# Patient Record
Sex: MALE | Race: WHITE
[De-identification: names, ages, dates, MRNs, and addresses within clinical notes are randomized per-mention and may not be internally consistent; named-entity substitution may affect disease eponyms.]

---

## 2019-08-05 ENCOUNTER — HOSPITAL ENCOUNTER (OUTPATIENT)
Dept: HOSPITAL 61 - RAD | Age: 54
Discharge: HOME | End: 2019-08-05
Attending: SPECIALIST
Payer: COMMERCIAL

## 2019-08-05 DIAGNOSIS — Z01.818: Primary | ICD-10-CM
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
## 2019-08-05 DIAGNOSIS — S46.012A: ICD-10-CM
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
## 2019-08-05 DIAGNOSIS — R94.31: ICD-10-CM
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
## 2019-08-05 LAB
ALBUMIN SERPL-MCNC: 4.1 G/DL (ref 3.4–5)
ALBUMIN/GLOB SERPL: 1.2 {RATIO} (ref 1–1.7)
ALP SERPL-CCNC: 67 U/L (ref 46–116)
ALT SERPL-CCNC: 29 U/L (ref 16–63)
ANION GAP SERPL CALC-SCNC: 13 MMOL/L (ref 6–14)
APTT BLD: 27 SEC (ref 24–38)
AST SERPL-CCNC: 23 U/L (ref 15–37)
BASOPHILS # BLD AUTO: 0 X10^3/UL (ref 0–0.2)
BASOPHILS NFR BLD: 0 % (ref 0–3)
BILIRUB SERPL-MCNC: 0.4 MG/DL (ref 0.2–1)
BUN SERPL-MCNC: 22 MG/DL (ref 8–26)
BUN/CREAT SERPL: 17 (ref 6–20)
CALCIUM SERPL-MCNC: 8.9 MG/DL (ref 8.5–10.1)
CHLORIDE SERPL-SCNC: 104 MMOL/L (ref 98–107)
CO2 SERPL-SCNC: 24 MMOL/L (ref 21–32)
CREAT SERPL-MCNC: 1.3 MG/DL (ref 0.7–1.3)
EOSINOPHIL NFR BLD: 0.1 X10^3/UL (ref 0–0.7)
EOSINOPHIL NFR BLD: 1 % (ref 0–3)
ERYTHROCYTE [DISTWIDTH] IN BLOOD BY AUTOMATED COUNT: 14.7 % (ref 11.5–14.5)
GFR SERPLBLD BASED ON 1.73 SQ M-ARVRAT: 57.3 ML/MIN
GLOBULIN SER-MCNC: 3.3 G/DL (ref 2.2–3.8)
GLUCOSE SERPL-MCNC: 132 MG/DL (ref 70–99)
HCT VFR BLD CALC: 42.4 % (ref 39–53)
HGB BLD-MCNC: 14.6 G/DL (ref 13–17.5)
LYMPHOCYTES # BLD: 1.7 X10^3/UL (ref 1–4.8)
LYMPHOCYTES NFR BLD AUTO: 26 % (ref 24–48)
MCH RBC QN AUTO: 31 PG (ref 25–35)
MCHC RBC AUTO-ENTMCNC: 34 G/DL (ref 31–37)
MCV RBC AUTO: 89 FL (ref 79–100)
MONO #: 0.4 X10^3/UL (ref 0–1.1)
MONOCYTES NFR BLD: 6 % (ref 0–9)
NEUT #: 4.4 X10^3/UL (ref 1.8–7.7)
NEUTROPHILS NFR BLD AUTO: 66 % (ref 31–73)
PLATELET # BLD AUTO: 190 X10^3/UL (ref 140–400)
POTASSIUM SERPL-SCNC: 3.9 MMOL/L (ref 3.5–5.1)
PROT SERPL-MCNC: 7.4 G/DL (ref 6.4–8.2)
PROTHROMBIN TIME: 11.8 SEC (ref 11.7–14)
RBC # BLD AUTO: 4.75 X10^6/UL (ref 4.3–5.7)
SODIUM SERPL-SCNC: 141 MMOL/L (ref 136–145)
WBC # BLD AUTO: 6.7 X10^3/UL (ref 4–11)

## 2019-08-05 PROCEDURE — 85730 THROMBOPLASTIN TIME PARTIAL: CPT

## 2019-08-05 PROCEDURE — 85651 RBC SED RATE NONAUTOMATED: CPT

## 2019-08-05 PROCEDURE — 36415 COLL VENOUS BLD VENIPUNCTURE: CPT

## 2019-08-05 PROCEDURE — 85025 COMPLETE CBC W/AUTO DIFF WBC: CPT

## 2019-08-05 PROCEDURE — 71046 X-RAY EXAM CHEST 2 VIEWS: CPT

## 2019-08-05 PROCEDURE — 93005 ELECTROCARDIOGRAM TRACING: CPT

## 2019-08-05 PROCEDURE — 85610 PROTHROMBIN TIME: CPT

## 2019-08-05 PROCEDURE — 80053 COMPREHEN METABOLIC PANEL: CPT

## 2019-08-05 NOTE — RAD
EXAM: Chest, 2 views.

 

HISTORY: Preoperative evaluation. Pain.

 

COMPARISON: None.

 

FINDINGS: A frontal view of the chest is obtained. There is no infiltrate,

pleural effusion or pneumothorax. The heart is normal in size.

 

IMPRESSION: No acute pulmonary finding.

 

Electronically signed by: Courtney Caruso MD (8/5/2019 12:35 PM) [facility]

## 2019-08-05 NOTE — EKG
General acute hospital

               [address] [place] [address]

Test Date:    2019               Test Time:    12:41:50

Pat Name:     ABDIRAHMAN SOLOMON        Department:   

Patient ID:   [id number]           Room:          

Gender:       M                        Technician:   

:          1965               Requested By: DAI SANCHEZ

Order Number: [id number]           Reading MD:     

                                 Measurements

Intervals                              Axis          

Rate:         95                       P:            34

WV:           160                      QRS:          27

QRSD:         80                       T:            6

QT:           344                                    

QTc:          435                                    

                           Interpretive Statements

SINUS RHYTHM

LEFT ATRIAL ABNORMALITY

ABNORMAL ECG

RI6.01          Unconfirmed report

No previous ECG available for comparison

## 2021-03-19 VITALS
SYSTOLIC BLOOD PRESSURE: 183 MMHG | DIASTOLIC BLOOD PRESSURE: 111 MMHG | SYSTOLIC BLOOD PRESSURE: 183 MMHG | DIASTOLIC BLOOD PRESSURE: 111 MMHG | DIASTOLIC BLOOD PRESSURE: 111 MMHG | SYSTOLIC BLOOD PRESSURE: 183 MMHG

## 2021-09-15 ENCOUNTER — HOSPITAL ENCOUNTER (OUTPATIENT)
Dept: HOSPITAL 61 - KCIC MRI | Age: 56
End: 2021-09-15
Attending: FAMILY MEDICINE
Payer: COMMERCIAL

## 2021-09-15 DIAGNOSIS — M51.25: Primary | ICD-10-CM

## 2021-09-15 DIAGNOSIS — M48.05: ICD-10-CM

## 2021-09-15 DIAGNOSIS — M25.78: ICD-10-CM

## 2021-09-15 DIAGNOSIS — M51.46: ICD-10-CM

## 2021-09-15 DIAGNOSIS — M41.86: ICD-10-CM

## 2021-09-15 PROCEDURE — 72148 MRI LUMBAR SPINE W/O DYE: CPT

## 2021-09-15 NOTE — [facility]
EXAM: Lumbar spine MRI without contrast.



HISTORY: Lower back pain. Sciatica.



TECHNIQUE: Multiplanar, multisequence magnetic resonance imaging of the lumbar spine was performed wi
thout contrast.



COMPARISON: None.



FINDINGS: There is a transitional lumbosacral segment. This is considered a partially sacralized L6 s
egment with rudimentary L6-S1 disc based on the number of nonrib-bearing lumbar segments. There is mi
ld lumbar scoliosis. There is no significant listhesis. There is degenerative endplate remodeling and
 disc space narrowing at multiple levels, predominantly at L5 to L6. There are few small endplate Yessica
morl's nodes. There are few osseous hemangiomas. The conus terminates at L1-L2.



At T12-L1, there is a shallow right paracentral to lateral recess disc protrusion and tiny left parac
entral disc protrusion superimposed on endplate remodeling. There is mild bilateral facet arthropathy
. There is mild right foraminal stenosis.



At L1-L2, there is no stenosis.



At L2-L3, there is left lateral predominant endplate remodeling. There is mild left greater than righ
t facet arthropathy. There is prominent dorsal epidural fat. There is no stenosis.



At L3-L4, there is a left extra foraminal to lateral disc protrusion superimposed on a diffuse disc b
ulge and endplate osteophytosis. There is moderate bilateral facet arthropathy. There is prominent do
rsal epidural fat. There is mild left greater than right foraminal stenosis with abutment the exiting
 left L3 nerve root. There is mild central canal stenosis.



At L4-L5, there is a shallow broad-based right paracentral disc protrusion superimposed on a disc bul
ge and endplate osteophytosis. There is mild pleural facet arthropathy. There is moderate right and m
ild left foraminal stenosis with abutment the exiting L4 nerve roots. There is mild central canal michelle
nosis.



At L5 to L6, there is a right lateral predominant disc bulge and endplate osteophytosis. There is a s
hallow broad-based posterior central disc protrusion and annular tear. There is mild right foraminal 
stenosis. The exiting left L5 nerve root is not well seen likely due to slice positioning.



At L6-S1, there is a rudimentary disc. There is no stenosis.



IMPRESSION: 

1. Transitional lumbosacral segment, considered L6 for this dictation.

2. Multilevel degenerative change involving the lumbar spine and lower thoracic spine, described in d
etail above. This is associated with mild right foraminal stenosis at T12-L1, mild left greater than 
right foraminal and central canal stenosis at L3-L4, moderate right and mild left foraminal and mild 
central canal stenosis at L4-L5, and mild right foraminal stenosis at L5-L6.



Electronically signed by: Courtney Caruso MD (9/15/2021 3:41 PM) [id number]

## 2021-09-30 ENCOUNTER — HOSPITAL ENCOUNTER (OUTPATIENT)
Dept: HOSPITAL 61 - PNCL | Age: 56
End: 2021-09-30
Attending: ANESTHESIOLOGY
Payer: COMMERCIAL

## 2021-09-30 DIAGNOSIS — Z98.890: ICD-10-CM

## 2021-09-30 DIAGNOSIS — M79.661: ICD-10-CM

## 2021-09-30 DIAGNOSIS — M54.5: Primary | ICD-10-CM

## 2021-09-30 DIAGNOSIS — M19.90: ICD-10-CM

## 2021-09-30 DIAGNOSIS — I10: ICD-10-CM

## 2021-09-30 PROCEDURE — 99214 OFFICE O/P EST MOD 30 MIN: CPT

## 2021-09-30 PROCEDURE — G0463 HOSPITAL OUTPT CLINIC VISIT: HCPCS

## 2021-09-30 NOTE — PDOC1
INITIAL PAIN CONSULT


DATE OF SERVICE:


DOS:


DATE: 9/30/21 


TIME: 16:01





CHIEF COMPLAINT:


Chief Complaint:


Low back and right lower extremity pain





HISTORY OF PRESENT ILLNESS:


56-year-old male presents history of pain low back right lower extremity since 

June 26, 2021.  Patient reports he did not have any specific injury or accident 

that he is aware of but woke up that morning had significant pain in the low sosa

k rating the right lower extremity posterior gluteus posterior lateral thigh 

lateral anterior thigh anteromedial thigh medial lower leg with cramping in the 

calf as well on the right side patient reports he is a  and had many 

small injuries over the years with strains and so forth but has never had a 

significant pain in the low back and right leg like this.  Patient reports he 

was loading a hose bed and his right foot was caught between the hose load and 

the bed and niece had a sudden jerk to the hose from his left causing an abrupt 

left twisting motion of his back and leg.  Patient reports it wakes him sleep 

about 2-3 times a night does not affect her bowel bladder control but does 

affect his go to walk he feels some fatigability in the right leg but not the 

left patient reports that sharp and stabbing shooting in the leg change during 

the day with activity worse with walking standing changing positions better with

sitting or laying down but again wakes him from sleep patient reports radiating 

the right leg and cramping in the legs well patient rates disability rating 0-10

10 being the worst is a 7 with family home was possibilities recreation as well 

sexual behavior 10 with occupational activity 6 with social activity self-care 

and life support activities specially.  Patient underwent physical therapy and 

is still doing the stretching strength exercises with this doing these daily and

reports it did help slightly but still significant pain in the low back and 

right lower extremity.  Patient is taking hydrocodone which does decrease the 

pain also Flexeril which is a mildly decreased the pain alternating Tylenol and 

ibuprofen both which do decrease it but only mildly as well.  Patient did have a

MRI scan of the lumbar spine show transitional lumbosacral segment with a 

partially sacralized L6 segment with the degenerative endplate remodeling and to

space narrowing at multiple levels predominantly L5-L6 as well as L4-5 with a 

shallow broad-based right paracentral disc protrusion superimposed on a disc bu

lge and endplate osteophytosis with moderate right and mild left foraminal 

stenosis and abutment of the exiting L4 nerve roots.





PAST MEDICAL HISTORY:


PMH:


Hypertension, diverticulosis, hearing loss, arthritis





PREVIOUS SURGERIES:


Past Surgical Hx:


Tonsillectomy, appendectomy, left rotator cuff repair, left hip surgery 2008, 

right hip surgery 2010, vocal hernia, left knee surgery with tendon repair 2015.





CURRENT MEDICATIONS:


Current Meds:





Active Scripts








 Medications  Dose


 Route/Sig


 Max Daily Dose Days Date Category


 


 Multi Vitamin


 Daily


  (Multivitamin) 1


 Each Tablet  1 Each


 PO DAILY


   3/16/21 Reported


 


 Aspirin 81 Mg


 Tab.chew  81 Mg


 PO DAILY


   3/16/21 Reported


 


 Fish Oil 1,200 mg


 Softgel


  (Omega-3/Dha/Epa/Fish


 Oil) 1 Each


 Capsule.dr  1 Each


 PO DAILY


   3/16/21 Reported


 


 Msm-Glucosamine


 250-250 Mg Cap


  (Glucosamine


 Sulfate/Msm) 1


 Each Capsule  1 Each


 PO DAILY


   3/16/21 Reported


 


 Losartan


 Potassium 100 Mg


 Tablet  100 Mg


 PO DAILY


   3/16/21 Reported











ALLERGIES;


Allergies:  


Coded Allergies:  


     No Known Drug Allergies (Unverified , 3/16/21)





FAMILY HISTORY:


Family Hx:


No major medical problems or conditions that he is aware of





SOCIAL HISTORY:


Social Hx:


Patient drinks alcohol only very rarely smokes less than a pack of cigarettes a 

week and has for about 5 years does not use any illegal illicit recreational 

drugs is  with spouse has 1 child at home lives locally in [place] 

[place] and is a , patient currently off work secondary to current 

pain situation.





REVIEW OF SYSTEMS:


ROS:


Positive for those items mentioned in history of present illness, all systems 

are reviewed, otherwise negative ,and are complete full and well-documented on 

patient's chart.





PHYSICAL EXAM:


VS:


Blood pressure is 131/87 pulse 99 respirations 18 temperature 90.5 F height is 

5 foot 11 inches weight is 226 pounds


PE:


PHYSICAL EXAMINATION:





GENERAL: The patient is awake, alert, oriented, appropriate, very pleasant 

demeanor


HEENT: Shows normocephalic, atraumatic.  Extraocular movements are intact and 

symmetrical.  Oral cavity: Mucous membranes moist and pink.


NECK: Shows anterior throat supple without palpable lymphadenopathy noted.  

Swallow reflex symmetrical.


CHEST: Shows normal on inspection.  Breath sounds are clear bilaterally, no 

rales rhonchi wheezes auscultated.


HEART: Shows S1, S2 clear.  No murmurs auscultated.


ABDOMEN: Soft, nontender, nondistended.  No palpable organomegaly is noted.  No 

rebound or guarding demonstrated.


BACK: Shows spine grossly in the midline.  Normal-appearing cervical lordotic 

curvature.  There is slightly increased thoracic kyphosis, some flattening of 

the lumbar lordotic curvature.  Lumbar paraspinous muscles show symmetrical on 

inspection, on palpation shows some moderate tenderness diffusely throughout the

upper, middle and lower distribution of the paraspinous muscles bilaterally and 

also into the lower thoracic paraspinous musculature, firm and tender, but 

without specific trigger points, without radiation of pain.  The patient has 

good rotational motion of the lumbar spine, both laterally as well as extension 

and flexion without significant difficulty.  No tenderness over the spinous 

processes, sacrum or sacroiliac regions.


EXTREMITIES: Lower extremities show deep tendon reflexes 2+ in the patellar and 

tendo calcaneus tendons.  Motor exam is 4 on a scale of 5 with right 

dorsiflexion, extension, quadriceps and hamstring flexion and 5/5 on the left.  

Peripheral pulses are 1 posterior tibial.  No peripheral edema is noted 

bilaterally.  Lower extremities are warm and dry to touch, equal in color and 

appearance.  Straight leg raise noted to be positive on the right about 45 

degrees, left side is neck.  Gaenslen's and Casimiro's maneuvers are negative 

bilaterally.  The patient is able to stand, stand on his toes that significant 

difficulty loss of balance, walks with a normal-appearing gait but does appear 

to favor the right lower extremity mildly.  Patient not use any assistive device

such as canes or walker to ambulate.


SKIN: Shows warm and dry, good turgor.  No edema.  No sores, rashes or bruising 

throughout.





IMPRESSION:


Impression:


56-year-old male with proximate 3-1/2-month history pain low back right lower 

extremity radicular fashion following L4-5 dermatomal distribution.


MRI scan lumbar spine as noted


Hypertension


Arthritis


Diverticulosis


Plan: Options were discussed with the patient including serve medical management

physical therapy interventional techniques.  As patient has done physical 

therapy and still doing stretching strength exercises at home as well as taking 

oral analgesics without significant improvement patient elects interventional 

techniques.  We discussed a lumbar epidural steroid injections description as 

well as anatomical models described procedure.  Patient will wait for 

preauthorization with insurance provider, once this is obtained return for a 

translaminar approach L4-5 level lumbar epidural steroid injection at that time 

with fluoroscopic guidance.  In the meantime, patient will continue with 

stretching strength exercises daily exercising as tolerated and oral analgesics 

as currently.











EZ MACHUCA MD               Sep 30, 2021 16:08

## 2021-10-20 ENCOUNTER — HOSPITAL ENCOUNTER (OUTPATIENT)
Dept: HOSPITAL 61 - PNCL | Age: 56
Discharge: HOME | End: 2021-10-20
Attending: ANESTHESIOLOGY
Payer: COMMERCIAL

## 2021-10-20 DIAGNOSIS — Z79.82: ICD-10-CM

## 2021-10-20 DIAGNOSIS — Z72.89: ICD-10-CM

## 2021-10-20 DIAGNOSIS — Z79.899: ICD-10-CM

## 2021-10-20 DIAGNOSIS — Z98.890: ICD-10-CM

## 2021-10-20 DIAGNOSIS — M48.061: ICD-10-CM

## 2021-10-20 DIAGNOSIS — M51.16: Primary | ICD-10-CM

## 2021-10-20 DIAGNOSIS — F17.210: ICD-10-CM

## 2021-10-20 DIAGNOSIS — I10: ICD-10-CM

## 2021-10-20 PROCEDURE — 62323 NJX INTERLAMINAR LMBR/SAC: CPT

## 2021-10-20 NOTE — PDOC4
Procedure Note:


ICD 10 Code:


ICD 10 Code:


M54.16


M51.36


M4 8.06





Procedure Note:


Patient was consented for lumbar epidural steroid injection with fluoroscopic 

guidance.  Risks were discussed including but not limited to: Bleeding, 

infection, possibility of epidural hematoma and subsequent neurological 

compromise, dural puncture, headaches, spinal cord and/or nerve damage, side 

effects of steroid medication, and poor results regarding pain control.  Patient

understands and wished to proceed.


Procedure is lumbar epidural steroid injection under local anesthetic using michelle

rile prep and drape at the L4-5 level using C-arm fluoroscopic guidance in both 

AP and lateral views medications injected is 120 mg Depo-Medrol +10mL 

preservative-free normal saline and 2 mL contrast- condition at discharge is 

stable patient tolerated procedure well had no complications.











EZ MACHUCA MD               Oct 20, 2021 14:45

## 2021-10-20 NOTE — PDOC
Progress Note - Pain Clinic


Date of Service:


DOS:


DATE: 10/20/21 


TIME: 14:42





Diagnosis:


Dx:


Lumbar radiculopathy with lumbar degenerative disease and lumbar spinal stenosis





History or Present Illness:


HPI:


56-year-old male returns complaining of pain low back and right lower extremity 

posterior gluteus posterior lateral thigh lateral anterior thigh anteromedial 

thigh worse with walking standing changing positions better with laying down but

is awakened from sleep still about every 6 hours patient reports her pain a 7 on

scale 10 is worse over the past week 7 on average 6 its least is a 7 today 

patient ports shooting aching sharp in the back radiating and stabbing in the 

right leg can be unbearable with extended standing and walking.  Patient reports

is better with sitting again but still waking her from sleep at night however 

patient reports no bowel or bladder incontinence no loss of motor function but 

significant fatigability the right leg and has stumbled twice since his last 

visit which is new as his leg does feel more and more weak progressively with 

time and with activity.  Patient reports no loss of bowel or bladder continence 

however.





Physical Exam:


VS:


Blood pressure is 146/89 pulse 71 respirations are 18 temperature is 98.6 F 

height is 5 foot 11 inches weight is 233 pounds


PE:


PHYSICAL EXAMINATION:





GENERAL: The patient is awake, alert, oriented, appropriate, very pleasant in 

demeanor


HEENT: Shows normocephalic, atraumatic.  Extraocular movements are intact and 

symmetrical.  Oral cavity: Mucous membranes moist and pink.  Dentition is 

intact.


NECK: Shows anterior throat supple without palpable lymphadenopathy noted.  

Swallow reflex symmetrical.


CHEST: Shows normal on inspection.  Breath sounds are clear bilaterally, no 

rales or rhonchi.


HEART: Shows S1, S2 clear.  No murmurs auscultated.


ABDOMEN: Soft, nontender, nondistended, obese.  No palpable organomegaly is 

noted. 


BACK: Shows spine grossly in the midline.  Normal-appearing cervical lordotic 

curvature.  There is slightly increased thoracic kyphosis, some minor flattening

of the lumbar lordotic curvature.  Lumbar paraspinous muscles show symmetrical 

on inspection, on palpation shows some moderate tenderness diffusely throughout 

the upper, middle and lower distribution of the paraspinous muscles without 

specific trigger points, without radiation of pain.  The patient has good 

rotational motion of the lumbar spine, both laterally as well as extension and 

flexion without significant difficulty.  


EXTREMITIES: Lower extremities show deep tendon reflexes 2+ in the patellar and 

tendo calcaneus tendons.  Motor exam is 5 on a scale of 5 with right 

dorsiflexion, extension, quadriceps and hamstring flexion and 5/5 on the left.  

Peripheral pulses are 1 posterior tibial.  No peripheral edema is noted 

bilaterally.  Lower extremities are warm and dry to touch, equal in color and 

appearance. 


SKIN: Shows warm and dry, good turgor.  No edema.  No sores, rashes or bruising 

throughout.





Procedure:


Procedure:


Options were discussed with patient.  Patient chart reviews his current 

medication regimen updated current review of systems updated today as well.  We 

will proceed with a lumbar epidural steroid injection today with fluoroscopic 

guidance risks were discussed including but not limited to: Bleeding, infection,

possibility of epidural hematoma and subsequent neurological compromise, dural 

puncture, headaches, spinal cord and/or nerve damage, side effects of steroid 

medication, and poor results regarding pain control.  Patient understands and 

wished to proceed.  Patient return to the clinic in approximate 2 weeks for 

follow-up, was counseled as return appointment, activity level, and side effect 

to be aware of.





Medication Injected:


Med Injected:


Procedure is lumbar epidural steroid injection under local anesthetic using 

sterile prep and drape at the L4-5 level using C-arm fluoroscopic guidance in 

both AP and lateral views medications injected is 120 mg Depo-Medrol +10mL 

preservative-free normal saline and 2 mL contrast- condition at discharge is 

stable patient tolerated procedure well had no complications.





Condition at Discharge:


Condition at Discharge:


Patient discharge stable, patient tolerated procedure well and had no 

complications.











EZ MACHUCA MD               Oct 20, 2021 14:45

## 2021-11-10 ENCOUNTER — HOSPITAL ENCOUNTER (EMERGENCY)
Dept: HOSPITAL 61 - ER | Age: 56
Discharge: HOME | End: 2021-11-10
Payer: COMMERCIAL

## 2021-11-10 VITALS
SYSTOLIC BLOOD PRESSURE: 157 MMHG | SYSTOLIC BLOOD PRESSURE: 157 MMHG | DIASTOLIC BLOOD PRESSURE: 97 MMHG | DIASTOLIC BLOOD PRESSURE: 97 MMHG | SYSTOLIC BLOOD PRESSURE: 157 MMHG | SYSTOLIC BLOOD PRESSURE: 157 MMHG | SYSTOLIC BLOOD PRESSURE: 157 MMHG | DIASTOLIC BLOOD PRESSURE: 97 MMHG | DIASTOLIC BLOOD PRESSURE: 97 MMHG | DIASTOLIC BLOOD PRESSURE: 97 MMHG | DIASTOLIC BLOOD PRESSURE: 97 MMHG | DIASTOLIC BLOOD PRESSURE: 97 MMHG | SYSTOLIC BLOOD PRESSURE: 157 MMHG | SYSTOLIC BLOOD PRESSURE: 157 MMHG

## 2021-11-10 VITALS — HEIGHT: 71 IN | WEIGHT: 230.38 LBS | BODY MASS INDEX: 32.25 KG/M2

## 2021-11-10 DIAGNOSIS — K57.92: Primary | ICD-10-CM

## 2021-11-10 DIAGNOSIS — I10: ICD-10-CM

## 2021-11-10 DIAGNOSIS — Z90.89: ICD-10-CM

## 2021-11-10 DIAGNOSIS — F17.200: ICD-10-CM

## 2021-11-10 DIAGNOSIS — K85.90: ICD-10-CM

## 2021-11-10 LAB
ALBUMIN SERPL-MCNC: 3.6 G/DL (ref 3.4–5)
ALBUMIN/GLOB SERPL: 1.1 {RATIO} (ref 1–1.7)
ALP SERPL-CCNC: 60 U/L (ref 46–116)
ALT SERPL-CCNC: 38 U/L (ref 16–63)
ANION GAP SERPL CALC-SCNC: 9 MMOL/L (ref 6–14)
APTT PPP: YELLOW S
AST SERPL-CCNC: 24 U/L (ref 15–37)
BACTERIA #/AREA URNS HPF: 0 /HPF
BASOPHILS # BLD AUTO: 0 X10^3/UL (ref 0–0.2)
BASOPHILS NFR BLD: 0 % (ref 0–3)
BILIRUB SERPL-MCNC: 0.6 MG/DL (ref 0.2–1)
BILIRUB UR QL STRIP: NEGATIVE
BUN SERPL-MCNC: 18 MG/DL (ref 8–26)
BUN/CREAT SERPL: 23 (ref 6–20)
CALCIUM SERPL-MCNC: 8.7 MG/DL (ref 8.5–10.1)
CHLORIDE SERPL-SCNC: 105 MMOL/L (ref 98–107)
CO2 SERPL-SCNC: 24 MMOL/L (ref 21–32)
CREAT SERPL-MCNC: 0.8 MG/DL (ref 0.7–1.3)
EOSINOPHIL NFR BLD: 0 % (ref 0–3)
EOSINOPHIL NFR BLD: 0 X10^3/UL (ref 0–0.7)
ERYTHROCYTE [DISTWIDTH] IN BLOOD BY AUTOMATED COUNT: 13.7 % (ref 11.5–14.5)
FIBRINOGEN PPP-MCNC: CLEAR MG/DL
GFR SERPLBLD BASED ON 1.73 SQ M-ARVRAT: 99.6 ML/MIN
GLUCOSE SERPL-MCNC: 108 MG/DL (ref 70–99)
HCT VFR BLD CALC: 39.7 % (ref 39–53)
HGB BLD-MCNC: 13.4 G/DL (ref 13–17.5)
LIPASE: 1305 U/L (ref 73–393)
LYMPHOCYTES # BLD: 1 X10^3/UL (ref 1–4.8)
LYMPHOCYTES NFR BLD AUTO: 9 % (ref 24–48)
MAGNESIUM SERPL-MCNC: 1.7 MG/DL (ref 1.8–2.4)
MCH RBC QN AUTO: 31 PG (ref 25–35)
MCHC RBC AUTO-ENTMCNC: 34 G/DL (ref 31–37)
MCV RBC AUTO: 93 FL (ref 79–100)
MONO #: 0.7 X10^3/UL (ref 0–1.1)
MONOCYTES NFR BLD: 6 % (ref 0–9)
NEUT #: 9.5 X10^3/UL (ref 1.8–7.7)
NEUTROPHILS NFR BLD AUTO: 85 % (ref 31–73)
NITRITE UR QL STRIP: NEGATIVE
PH UR STRIP: 5 [PH]
PLATELET # BLD AUTO: 195 X10^3/UL (ref 140–400)
POTASSIUM SERPL-SCNC: 4 MMOL/L (ref 3.5–5.1)
PROT SERPL-MCNC: 6.8 G/DL (ref 6.4–8.2)
PROT UR STRIP-MCNC: NEGATIVE MG/DL
RBC # BLD AUTO: 4.27 X10^6/UL (ref 4.3–5.7)
RBC #/AREA URNS HPF: (no result) /HPF (ref 0–2)
SODIUM SERPL-SCNC: 138 MMOL/L (ref 136–145)
UROBILINOGEN UR-MCNC: 0.2 MG/DL
WBC # BLD AUTO: 11.2 X10^3/UL (ref 4–11)
WBC #/AREA URNS HPF: 0 /HPF (ref 0–4)

## 2021-11-10 PROCEDURE — 80053 COMPREHEN METABOLIC PANEL: CPT

## 2021-11-10 PROCEDURE — 85025 COMPLETE CBC W/AUTO DIFF WBC: CPT

## 2021-11-10 PROCEDURE — 96361 HYDRATE IV INFUSION ADD-ON: CPT

## 2021-11-10 PROCEDURE — 74177 CT ABD & PELVIS W/CONTRAST: CPT

## 2021-11-10 PROCEDURE — 36415 COLL VENOUS BLD VENIPUNCTURE: CPT

## 2021-11-10 PROCEDURE — 81001 URINALYSIS AUTO W/SCOPE: CPT

## 2021-11-10 PROCEDURE — 83735 ASSAY OF MAGNESIUM: CPT

## 2021-11-10 PROCEDURE — 96375 TX/PRO/DX INJ NEW DRUG ADDON: CPT

## 2021-11-10 PROCEDURE — 96374 THER/PROPH/DIAG INJ IV PUSH: CPT

## 2021-11-10 PROCEDURE — 83690 ASSAY OF LIPASE: CPT

## 2021-11-10 PROCEDURE — 99285 EMERGENCY DEPT VISIT HI MDM: CPT

## 2021-11-10 NOTE — RAD
EXAM: CT Abdomen and Pelvis with IV contrast



CLINICAL HISTORY: abdominal pain 



COMPARISON: none



TECHNIQUE: Helical CT of the abdomen and pelvis was performed following the administration of intrave
nous contrast. Axial, coronal and sagittal reformatted images were generated.



PQRS compliance statement - One or more of the following individualized dose reduction techniques wer
e utilized for this study:

1.  Automated exposure control

2.  Adjustment of the mA and/or kV according to patient size

3.  Use of iterative reconstruction technique



FINDINGS:

Lower Chest: 

Dependent opacities in lower lobes bilaterally likely scarring/atelectasis. Coronary calcifications a
re seen. Heart is not enlarged. No pericardial effusion.



Abdomen and Pelvis: 

Low-density inferior right hepatic lesion may be cystic. Gallbladder is distended but otherwise unrem
arkable. No biliary ductal dilatation. Pancreas and spleen are unremarkable. Adrenal glands are anthony
l. Subcentimeter hypodense right lower pole renal lesion likely cystic but too small to accurately ch
aracterize. No hydronephrosis. No hydroureter. Bladder is obscured by streak artifact from the bilate
ral hip arthroplasties.



Aorta is normal in caliber. No abdominal or pelvic lymphadenopathy. No abdominal or pelvic ascites. S
mall fat-containing inguinal hernias. No small or large bowel dilatation. No bowel obstruction. Appen
shekhar is not seen although no pericecal inflammatory change is seen. Extensive colonic diverticula are 
seen extending from the splenic flexure through the sigmoid colon. There is infiltration about coloni
c diverticula in the region of the sigmoid flexure/proximal descending colon consistent with acute di
verticulitis. No evidence for perforation or loculated fluid collection. No abdominal or pelvic ascit
es. No abdominal or pelvic lymphadenopathy.





Bones: 

Degenerative changes of the spine are seen.



IMPRESSION: 

1.  Acute diverticulitis of the splenic flexure/proximal descending colon without evidence for perfor
ation or associated loculated collection. Following resolution of the acute process, recommend furthe
r evaluation with colonoscopy if not recently performed, as underlying mass is not excluded.



Electronically signed by: Aaron Negrete MD (11/10/2021 8:43 AM) [id number]

## 2021-11-10 NOTE — PHYS DOC
Past Medical History


Past Medical History:  Hypertension, Other


Additional Past Medical Histor:  DIVERTICULITIS


Past Surgical History:  Appendectomy, Tonsillectomy


Additional Past Surgical Histo:  LEFT HIP, RIGHT HIP, LEFT KNEE, ROTATOR CUFF, 

UMBILICAL HERNIA


Smoking Status:  Current Every Day Smoker


Alcohol Use:  Occasionally





General Adult


EDM:


Chief Complaint:  ABDOMINAL PAIN





HPI:


HPI:





Patient is a 56  year old male who presents with upper abdominal pain, which 

began around 11 PM last night.  He reports that he feels like he might have 

pancreatitis again.  He reports epigastric and left upper quadrant pain, which 

radiates to his back.  He reports nausea and vomiting.  Denies hematemesis.  

Denies lower abdominal pain.  Denies bowel habit changes, denies diarrhea 

constipation, denies hematemesis, denies melena, denies hematochezia.  Denies 

fevers or chills.  Denies chest pain or dyspnea.  He has had a previous 

appendectomy and umbilical hernia repair.  He denies urinary symptoms.





Review of Systems:


Review of Systems:


Constitutional:   Denies fever or chills. []


HENT:   Denies nasal congestion or sore throat. [] 


Respiratory:   Denies cough or shortness of breath. [] 


Cardiovascular:   Denies chest pain or edema. [] 


GI:   Reports abdominal pain, nausea and vomiting.  Denies diarrhea, 

constipation, melena or hematochezia.


:  Denies urinary symptoms.


Musculoskeletal:   Reports abdominal pain radiating to his mid back.  Denies 

joint pain or swelling.


Neurologic:   Denies headache, focal weakness or sensory changes. [] [] 


Psychiatric:  Denies depression or anxiety. []





Heart Score:


C/O Chest Pain:  No


Risk Factors:


Risk Factors:  DM, Current or recent (<one month) smoker, HTN, HLP, family 

history of CAD, obesity.


Risk Scores:


Score 0 - 3:  2.5% MACE over next 6 weeks - Discharge Home


Score 4 - 6:  20.3% MACE over next 6 weeks - Admit for Clinical Observation


Score 7 - 10:  72.7% MACE over next 6 weeks - Early Invasive Strategies





Allergies:


Allergies:





Allergies








Coded Allergies Type Severity Reaction Last Updated Verified


 


  No Known Drug Allergies    3/16/21 No











Physical Exam:


PE:





Constitutional: Well developed, well nourished, no acute distress, non-toxic 

appearance. []


HENT: Normocephalic, atraumatic, oropharynx is patent and clear, mucous 

membranes moist


Eyes: Sclera clear, anicteric


Neck: Normal range of motion, no tenderness, supple, no stridor. [] 


Cardiovascular:Heart rate regular rhythm, +2 radial and +2 posterior tibial 

pulses bilaterally.


Lungs & Thorax:  Bilateral breath sounds clear to auscultation []


Abdomen: Abdomen soft, nondistended, tenderness to palpation in the left upper 

quadrant and midepigastrium, voluntary guarding, no rebound tenderness, no lower

 abdominal tenderness, no CVA tenderness, no palpable mass organomegaly, no 

flank or abdominal ecchymoses noted.


Skin: Warm, dry, no erythema, no rash. No jaundice.


Back: No tenderness, no CVA tenderness. [] 


Extremities: No tenderness, no cyanosis, no clubbing, ROM intact, no edema. No 

calf tenderness.


Neurologic: Alert and oriented X 3, normal motor function, normal sensory 

function, no focal deficits noted. []


Psychologic: Affect normal, judgement normal, mood normal. []





EKG:


EKG:


[]





Radiology/Procedures:


Radiology/Procedures:


                                        


                                 IMAGING REPORT





                                     Signed





PATIENT: ABDIRAHMAN SOLOMON CACCOUNT: [id number]     MRN#: [id number]


: 1965           LOCATION: ER              AGE: 56


SEX: M                    EXAM DT: 11/10/21         ACCESSION#: [id number]


STATUS: REG ER            ORD. PHYSICIAN: THEE JUAREZ DO


REASON: abdominal pain


PROCEDURE: CT ABD PELV W/ IV CONTRST ONLY





EXAM: CT Abdomen and Pelvis with IV contrast





CLINICAL HISTORY: abdominal pain 





COMPARISON: none





TECHNIQUE: Helical CT of the abdomen and pelvis was performed following the 

administration of intravenous contrast. Axial, coronal and sagittal reformatted 

images were generated.





PQRS compliance statement - One or more of the following individualized dose 

reduction techniques were utilized for this study:


1.  Automated exposure control


2.  Adjustment of the mA and/or kV according to patient size


3.  Use of iterative reconstruction technique





FINDINGS:


Lower Chest: 


Dependent opacities in lower lobes bilaterally likely scarring/atelectasis. 

Coronary calcifications are seen. Heart is not enlarged. No pericardial 

effusion.





Abdomen and Pelvis: 


Low-density inferior right hepatic lesion may be cystic. Gallbladder is 

distended but otherwise unremarkable. No biliary ductal dilatation. Pancreas and

 spleen are unremarkable. Adrenal glands are normal. Subcentimeter hypodense 

right lower pole renal lesion likely cystic but too small to accurately 

characterize. No hydronephrosis. No hydroureter. Bladder is obscured by streak 

artifact from the bilateral hip arthroplasties.





Aorta is normal in caliber. No abdominal or pelvic lymphadenopathy. No abdominal

 or pelvic ascites. Small fat-containing inguinal hernias. No small or large 

bowel dilatation. No bowel obstruction. Appendix is not seen although no 

pericecal inflammatory change is seen. Extensive colonic diverticula are seen 

extending from the splenic flexure through the sigmoid colon. There is 

infiltration about colonic diverticula in the region of the sigmoid 

flexure/proximal descending colon consistent with acute diverticulitis. No 

evidence for perforation or loculated fluid collection. No abdominal or pelvic 

ascites. No abdominal or pelvic lymphadenopathy.








Bones: 


Degenerative changes of the spine are seen.





IMPRESSION: 


1.  Acute diverticulitis of the splenic flexure/proximal descending colon 

without evidence for perforation or associated loculated collection. Following 

resolution of the acute process, recommend further evaluation with colonoscopy 

if not recently performed, as underlying mass is not excluded.





Electronically signed by: Aaron Negrete MD (11/10/2021 8:43 AM) [id number]














DICTATED and SIGNED BY:     AARON NEGRETE MD


DATE:     11/10/21 [id number]





Course & Med Decision Making:


Course & Med Decision Making


Pertinent Labs and Imaging studies reviewed. (See chart for details)





I have discussed the findings, differential diagnosis and plan of care with the 

patient.  He is given IV fluids, IV Zofran, IV fentanyl for pain.  He has a 

nonsurgical abdominal exam.  I have discussed the findings of pancreatitis based

 on elevated lipase levels, similar to previous elevated lipase levels from his 

hospitalization for pancreatitis.  No radiographic mention of acute pancreatitis

 based on CT imaging.  He does have findings of diverticulitis as well.  I have 

recommended admission, though he consider admission for not only pancreatitis 

but also acute diverticulitis.  I expressed concern that he may potentially 

worsen.  He is averse to this notion and really desires to go home.  He reports 

to me that he has "leftover" Augmentin from previous diverticulitis infections, 

the last of which he reportedly had a year or more ago.  I asked him why he had 

left over antibiotics.  He reports that he "buys 3 months at a time."  I 

explained that that is not how antibiotics are prescribed, nor is this how they 

should be taken.  He indicates a history of tendinopathy from prior 

fluoroquinolone use, so he refuses to take this for treatment of diverticulitis.

  I explained this is reasonable to avoid fluoroquinolones, and I have no 

problem prescribing Augmentin, though I am perplexed as to why he is reportedly 

"buying" 3 months of antibiotics at a time, as this is not appropriate, endorses

 appropriate prescribing practice.  I will prescribe him a new dose of 

Augmentin, and I told him he should not have any leftover, as he should take the

 full 10-day course of this medication.  I explained that he should come back to

 the ER immediately for any worsening symptoms, such as fever, uncontrolled 

vomiting, more severe pain, if he is unable to tolerate p.o. meds or fluids or 

for any other concerns.  In the meantime I do recommend that he drink clear 

fluids, avoid solid foods, avoid alcohol.  He reports he will follow-up with his

 primary care physician.  I did explain that he should see GI as well, for 

endoscopy, given recurrent diverticulitis.  He verbalizes understanding, he is 

comfortable with plan of care.  He verbalized understanding of instructions and 

precautions given.





Dragon Disclaimer:


Dragon Disclaimer:


This electronic medical record was generated, in whole or in part, using a voice

 recognition dictation system.





Departure


Departure


Impression:  


   Primary Impression:  


   Acute diverticulitis


   Additional Impression:  


   Pancreatitis


   Qualified Codes:  K85.90 - Acute pancreatitis without necrosis or infection, 

   unspecified


Disposition:  01 HOME / SELF CARE / HOMELESS


Condition:  STABLE


Referrals:  


PHILLIP FINN MD (PCP)


Patient Instructions:  Acute Pancreatitis, Diverticulitis





Additional Instructions:  


Use the medications as directed.  Drink clear fluids.  Avoid solid foods, avoid 

spicy or greasy foods.  Avoid alcohol.  Return to the ER immediately for 

uncontrolled vomiting, dehydration, fever 100.4 or higher, more severe 

uncontrolled pain or any other concerns.  Your lipase is elevated today, which 

does indicate pancreatitis.  Your pancreas appears to be normal on CT.  Your CT 

does show findings of acute diverticulitis in your left colon.  Please contact 

your primary care physician for follow-up.  It would be helpful for you to also 

see outpatient GI services for colonoscopy, given the recurrent diverticulitis 

episodes.


Scripts


Amoxicillin/Potassium Clav (AUGMENTIN 875-125 TABLET) 1 Each Tablet


1 TAB PO BID for 10 Days, #20 TAB 0 Refills


   Prov: THEE JUAREZ DO         11/10/21 


Hydrocodone Bit/Acetaminophen (HYDROCODONE-APAP 5-325  **) 1 Tab Tablet


1 TAB PO PRN Q6HRS PRN for PAIN, #20 TAB 0 Refills


   Prov: THEE JUAREZ DO         11/10/21 


Ondansetron Hcl (ZOFRAN) 4 Mg Tablet


4 MG PO PRN TID PRN for VOMITING, #20 TAB


   nausea/vomiting


   Prov: THEE JUAREZ DO         11/10/21











THEE JUAREZ DO             Nov 10, 2021 07:12

## 2021-11-24 ENCOUNTER — HOSPITAL ENCOUNTER (OUTPATIENT)
Dept: HOSPITAL 61 - PNCL | Age: 56
Discharge: HOME | End: 2021-11-24
Attending: ANESTHESIOLOGY
Payer: COMMERCIAL

## 2021-11-24 DIAGNOSIS — M48.061: ICD-10-CM

## 2021-11-24 DIAGNOSIS — I10: ICD-10-CM

## 2021-11-24 DIAGNOSIS — Z98.890: ICD-10-CM

## 2021-11-24 DIAGNOSIS — Z72.89: ICD-10-CM

## 2021-11-24 DIAGNOSIS — M51.16: Primary | ICD-10-CM

## 2021-11-24 DIAGNOSIS — Z79.899: ICD-10-CM

## 2021-11-24 DIAGNOSIS — F17.210: ICD-10-CM

## 2021-11-24 DIAGNOSIS — Z79.82: ICD-10-CM

## 2021-11-24 PROCEDURE — 62323 NJX INTERLAMINAR LMBR/SAC: CPT

## 2021-11-24 NOTE — PDOC4
Procedure Note:


ICD 10 Code:


ICD 10 Code:


M54.16


M4 8.06


M51.36





Procedure Note:


Patient was consented for lumbar epidural steroid injection with fluoroscopic 

guidance.  Risks were discussed including but not limited to: Bleeding, 

infection, possibility of epidural hematoma and subsequent neurological 

compromise, dural puncture, headaches, spinal cord and/or nerve damage, side 

effects of steroid medication, and poor results regarding pain control.  Patient

understands and wished to proceed.


Procedure is lumbar epidural steroid injection under local anesthetic using michelle

rile prep and drape at the L4-5 level using C-arm fluoroscopic guidance in both 

AP and lateral views medications injected is 120 mg Depo-Medrol +10mL 

preservative-free normal saline and 2 mL contrast- condition at discharge is 

stable patient tolerated procedure well had no complications.











EZ MACHUCA MD               Nov 24, 2021 11:58

## 2021-11-24 NOTE — PDOC
Progress Note - Pain Clinic


Date of Service:


DOS:


DATE: 11/24/21 


TIME: 11:55





Diagnosis:


Dx:


Lumbar radiculopathy with lumbar degenerative disease and lumbar spinal stenosis





History or Present Illness:


HPI:


56-year-old male returns for follow-up status post lumbar epidural steroid 

traction x1.  Patient reports approximately 50% initially but now about 30% 

overall reduction pain the low back and left lower extremity patient reports no 

pain in the posterior gluteus lateral thigh anterior thigh medial thigh medial 

lower leg sharp and shooting in the back stabbing the back shooting and 

radiating the leg can be severe at times worse with activity standing walking 

changing positions better with sitting or laying down but wakes him from sleep 

at least once or twice a night patient reports no loss of motor function and he 

is walking better but he still has significant pain with ambulation in the low 

back left lower extremity patient rates his pain a 6 on scale 10 is worse over 

the past week 4-5 on average and 1 at its least is a 5 today.  Patient reports 

no loss of motor function but significant fatigability the left lower extremity 

and no bowel or bladder incontinence.





Physical Exam:


VS:


Blood pressure is 145/101 pulse 71 respirations are 16 temperature 98.4 F 

height is 5 foot 11 inches weight is 229 pounds


PE:


PHYSICAL EXAMINATION:





GENERAL: The patient is awake, alert, oriented, appropriate, very pleasant in 

demeanor


HEENT: Shows normocephalic, atraumatic.  Extraocular movements are intact and 

symmetrical.  Oral cavity: Mucous membranes moist and pink.  Dentition is 

intact.


NECK: Shows anterior throat supple without palpable lymphadenopathy noted.  

Swallow reflex symmetrical.


CHEST: Shows normal on inspection.  Breath sounds are clear bilaterally, distant

but no rales or rhonchi.


HEART: Shows S1, S2 clear.  No murmurs auscultated.


BACK: Shows spine grossly in the midline.  Normal-appearing cervical lordotic 

curvature.  There is slightly increased thoracic kyphosis, some flattening of 

the lumbar lordotic curvature.  Lumbar paraspinous muscles show symmetrical on 

inspection, on palpation shows some moderate tenderness diffusely throughout the

upper, middle and lower distribution of the paraspinous muscles, but without 

specific trigger points, without radiation of pain.  The patient has good 

rotational motion of the lumbar spine, both laterally as well as extension and 

flexion without significant difficulty. 


EXTREMITIES: Lower extremities show deep tendon reflexes 2+ in the patellar and 

tendo calcaneus tendons.  Motor exam is 5 on a scale of 5 with right 

dorsiflexion, extension, quadriceps and hamstring flexion and 5/5 on the left.  

Peripheral pulses are 1+ posterior tibial.  No peripheral edema is noted 

bilaterally.  Lower extremities are warm and dry.


SKIN: Shows warm and dry, good turgor.  No edema.  No sores, rashes or bruising 

throughout.





Procedure:


Procedure:


Options discussed with patient.  Patient chart was used his current medication 

regimen updated current review of systems updated today as well.  We will 

proceed with a lumbar epidural steroid injection stable fluoroscopic guidance.  

Risks were discussed including but not limited to: Bleeding, infection, 

possibility of epidural hematoma and subsequent neurological compromise, dural 

puncture, headaches, spinal cord and/or nerve damage, side effects of steroid 

medication, and poor results regarding pain control.  Patient understands and 

wished to proceed.  She will return to clinic in approximate 2 weeks for follow-

up, was counseled as to return appointment, activity level, and side effect to 

be aware of.





Medication Injected:


Med Injected:


Procedure is lumbar epidural steroid injection under local anesthetic using 

sterile prep and drape at the L4-5 level using C-arm fluoroscopic guidance in 

both AP and lateral views medications injected is 120 mg Depo-Medrol +10mL 

preservative-free normal saline and 2 mL contrast- condition at discharge is 

stable patient tolerated procedure well had no complications.





Condition at Discharge:


Condition at Discharge:


Condition at discharge stable, patient tolerated the procedure well and had no 

complications.











EZ MACHUCA MD               Nov 24, 2021 11:58

## 2022-02-08 ENCOUNTER — HOSPITAL ENCOUNTER (OUTPATIENT)
Dept: HOSPITAL 61 - PNCL | Age: 57
Discharge: HOME | End: 2022-02-08
Attending: ANESTHESIOLOGY
Payer: COMMERCIAL

## 2022-02-08 DIAGNOSIS — M51.16: Primary | ICD-10-CM

## 2022-02-08 DIAGNOSIS — F17.210: ICD-10-CM

## 2022-02-08 DIAGNOSIS — M48.061: ICD-10-CM

## 2022-02-08 DIAGNOSIS — I10: ICD-10-CM

## 2022-02-08 DIAGNOSIS — Z98.890: ICD-10-CM

## 2022-02-08 DIAGNOSIS — Z72.89: ICD-10-CM

## 2022-02-08 DIAGNOSIS — Z79.899: ICD-10-CM

## 2022-02-08 DIAGNOSIS — Z79.82: ICD-10-CM

## 2022-02-08 PROCEDURE — G0463 HOSPITAL OUTPT CLINIC VISIT: HCPCS

## 2022-02-08 PROCEDURE — 99212 OFFICE O/P EST SF 10 MIN: CPT

## 2022-02-08 NOTE — PDOC
Progress Note - Pain Clinic


Date of Service:


DOS:


DATE: 2/8/22 


TIME: 10:32





Diagnosis:


Dx:


Lumbar radiculopathy with lumbar degenerative disease and lumbar spinal stenosis





History or Present Illness:


HPI:


56-year-old male returns status post lumbar epidural steroid traction last seen 

November 24, 2020 patient did very well with near undersign improvement for 

about 5 weeks following the injection patient reports pain is beginning to 

return now is much better than it was previously.  Patient has been off work and

has some paperwork for release of information regarding his medical treatment 

which we will complete for him for his work today as well.  Patient has 

appointment he reports to see neurosurgeon next week regarding possible surgical

alternatives for his pain and radiculopathy as he does have a shallow broad-

based right paracentral disc protrusion at L45 causing some moderate right and 

mild left foraminal stenosis and abutting the exiting L4 nerve roots.  Patient 

reports he did very well after the last injection but again the pain is 

increasing in the low back and especially on the left side.  Patient rates as a 

3 on scale 10 is worse over the past week 2 on average 1 its least is a 2 today.

 Patient gets sharp and shooting in the low back and left leg on and off in 

intensity also stabbing sensation in the back itself but without motor deficits.

 Patient reports fatigability with the left lower extremity with walking and 

standing better with sitting or laying down waking her from sleep currently.  

Patient reports no bowel or bladder incontinence.





Physical Exam:


VS:


Blood pressure is 122/79 pulse 101 respirations 18 temperature 98.6 F height 5 

foot 11 is 236 pounds.


PE:


PHYSICAL EXAMINATION:





GENERAL: The patient is awake, alert, oriented, appropriate, very pleasant in 

demeanor


HEENT: Shows normocephalic, atraumatic.  Extraocular movements are intact and 

symmetrical.  Oral cavity: Mucous membranes moist and pink.  Dentition is 

intact.


NECK: Shows anterior throat supple without palpable lymphadenopathy noted.  

Swallow reflex symmetrical.


CHEST: Shows normal on inspection.  Breath sounds are clear bilaterally, distant

but no rales or rhonchi auscultated.


HEART: Shows S1, S2 clear.  No murmurs auscultated.


ABDOMEN: Soft, nontender, nondistended.  No palpable organomegaly is noted.


BACK: Shows spine grossly in the midline.  Normal-appearing cervical lordotic 

curvature.  There is mildly increased thoracic kyphosis, some minor flattening 

of the lumbar lordotic curvature.  Lumbar paraspinous muscles show symmetrical 

on inspection, on palpation shows some moderate tenderness diffusely throughout 

the upper, middle and lower distribution of the paraspinous muscles without 

specific trigger points, without radiation of pain.  The patient has good 

rotational motion of the lumbar spine, both laterally as well as extension and 

flexion without significant difficulty.  No tenderness over the spinous 

processes, sacrum or sacroiliac regions.


EXTREMITIES: Lower extremities show deep tendon reflexes 2+ in the patellar and 

tendo calcaneus tendons.  Motor exam is 5 on a scale of 5 with right 

dorsiflexion, extension, quadriceps and hamstring flexion and 5/5 on the left.  

Peripheral pulses are 1+ posterior tibial.  No peripheral edema is noted bilat

erally.  Lower extremities are warm and dry to touch, equal in color and 

appearance.  


SKIN: Shows warm and dry, good turgor.  No edema.  No sores, rashes or bruising 

throughout.





Procedure:


Procedure:


Options were discussed with the patient.  Patient's old chart was reviewed as 

his current medication regimen updated current review of systems updated today 

as well.  We will have patient follow-up with neurosurgical evaluation at this 

time as patient doing significantly better after injection November 2021.  

Patient does have increasing radicular pain however in the left lower extremity 

in the L4-5 dermatomal distribution which will be evaluated with neurosurgery 

next week.  In the meantime, patient was encouraged to maintain strengthening 

stretching exercises as well as oral analgesics as he is currently.





Medication Injected:


Med Injected:


None





Condition at Discharge:


Condition at Discharge:


Condition at discharge is stable.











EZ MACHUCA MD                Feb 8, 2022 10:37

## 2022-03-25 ENCOUNTER — HOSPITAL ENCOUNTER (OUTPATIENT)
Dept: HOSPITAL 61 - PNCL | Age: 57
Discharge: HOME | End: 2022-03-25
Attending: ANESTHESIOLOGY
Payer: COMMERCIAL

## 2022-03-25 DIAGNOSIS — I10: ICD-10-CM

## 2022-03-25 DIAGNOSIS — Z79.82: ICD-10-CM

## 2022-03-25 DIAGNOSIS — Z98.890: ICD-10-CM

## 2022-03-25 DIAGNOSIS — Z79.899: ICD-10-CM

## 2022-03-25 DIAGNOSIS — F17.210: ICD-10-CM

## 2022-03-25 DIAGNOSIS — M51.16: Primary | ICD-10-CM

## 2022-03-25 DIAGNOSIS — Z72.89: ICD-10-CM

## 2022-03-25 DIAGNOSIS — M48.061: ICD-10-CM

## 2022-03-25 PROCEDURE — 62323 NJX INTERLAMINAR LMBR/SAC: CPT

## 2022-03-25 NOTE — PDOC
Progress Note - Pain Clinic


Date of Service:


DOS:


DATE: 3/25/22 


TIME: 09:46





Diagnosis:


Dx:


Lumbar radiculopathy with lumbar degenerative disease and lumbar spinal stenosis





History or Present Illness:


HPI:


56-year-old male returns for follow-up status post lumbar epidural steroid 

injection last on November 24, 2021 with Nearhood percent improvement for about 

5 weeks following the injection patient reports pain returning on the low back 

and into the lower extremity more on the right than the left at this time as it 

was just on the left side on previous exam now into the right leg which is new 

for him since his last visit.  Patient reports the pain is worse with walking 

standing changing positions getting up from seated position is radiating in the 

posterior gluteus lateral thigh anterior thigh medial thigh again on the right 

side now not the left patient report is aching and sharp shooting in the leg 

stabbing in the back radiating in the lower extremity on and off in intensity 

but worse with walking standing better with sitting or laying down generally 

does not awaken from sleep at night.  Patient reports no bowel or bladder 

incontinence rates his pain is a 6 on scale 10 is worst average and of 2 at its 

least and is a 6 today.  Patient reports no loss of motor function, but si

gnificant fatigability of the right lower extremity with walking.  Patient 

reports the pain in the back is becoming more severe again on the right side 

greater than the left worse with twisting bending stooping and extending or 

reaching shelves above his head when standing.





Physical Exam:


VS:


Blood pressure is 122/82 pulse 88 respirations 16 temperature 90.5 F height is 

5 foot 11 inches weight is 235 pounds.


PE:


PHYSICAL EXAMINATION:





GENERAL: The patient is awake, alert, oriented, appropriate, very pleasant in 

demeanor


HEENT: Shows normocephalic, atraumatic.  Extraocular movements are intact and 

symmetrical.  Oral cavity: Mucous membranes moist and pink.  Dentition is 

intact.


NECK: Shows anterior throat supple without palpable lymphadenopathy noted.  

Swallow reflex symmetrical.


CHEST: Shows normal on inspection.  Breath sounds are clear bilaterally, distant

but no rales or rhonchi.


HEART: Shows S1, S2 clear.  No murmurs auscultated.


ABDOMEN: Soft, nontender, nondistended.  No palpable organomegaly is noted. 


BACK: Shows spine grossly in the midline.  Normal-appearing cervical lordotic 

curvature.  There is mildly increased thoracic kyphosis, some minor flattening 

of the lumbar lordotic curvature.  Lumbar paraspinous muscles show symmetrical 

on inspection, on palpation shows some moderate tenderness diffusely throughout 

the upper, middle and lower distribution of the paraspinous muscles without 

specific trigger points, without radiation of pain.  The patient has good 

rotational motion of the lumbar spine, both laterally as well as extension and 

flexion without significant difficulty.  


EXTREMITIES: Lower extremities show deep tendon reflexes 2+ in the patellar and 

tendo calcaneus tendons.  Motor exam is 5 on a scale of 5 with right 

dorsiflexion, extension, quadriceps and hamstring flexion and 5/5 on the left.  

Peripheral pulses are 1+ posterior tibial.  No peripheral edema is noted 

bilaterally.  Lower extremities are warm and dry.


SKIN: Shows warm and dry, good turgor.  No edema.  No sores, rashes or bruising 

throughout.





Procedure:


Procedure:


Options were discussed with the patient.  Patient's old chart was reviewed as 

his current medication regimen updated current review of systems updated today 

as well.  Patient was new right-sided radicular symptoms in L4-5 dermatomal 

distribution.  We will proceed with a lumbar epidural steroid injection today 

with fluoroscopic guidance.  Risks were discussed including but not limited to: 

Bleeding, infection, possibility of epidural hematoma and subsequent 

neurological compromise, dural puncture, headaches, spinal cord and/or nerve 

damage, side effects of steroid medication, and poor results regarding pain 

control.  Patient understands and wished to proceed.  Patient will return to the

clinic in approximately 2 weeks for follow-up, was counseled as to return 

appointment, activity level, and side effect to be aware of.





Medication Injected:


Med Injected:


Procedure is lumbar epidural steroid injection under local anesthetic using 

sterile prep and drape at the L4-5 level using C-arm fluoroscopic guidance in 

both AP and lateral views medications injected is 20 mg dexamethasone +10mL 

preservative-free normal saline and 2 mL contrast- condition at discharge is 

stable patient tolerated procedure well had no complications.





Condition at Discharge:


Condition at Discharge:


Condition at discharge is stable, patient tolerated the procedure well and had 

no complications.











EZ MACHUCA MD               Mar 25, 2022 09:50

## 2022-03-25 NOTE — PDOC4
Procedure Note:


ICD 10 Code:


ICD 10 Code:


M54.16


M51.36


M4 8.06





Procedure Note:


Patient was consented for lumbar epidural steroid injection with fluoroscopic 

guidance.  Risks were discussed including but not limited to: Bleeding, 

infection, possibility of epidural hematoma and subsequent neurological 

compromise, dural puncture, headaches, spinal cord and/or nerve damage, side 

effects of steroid medication, and poor results regarding pain control.  Patient

understands and wished to proceed.


Procedure is lumbar epidural steroid injection under local anesthetic using michelle

rile prep and drape at the L4-5 level using C-arm fluoroscopic guidance in both 

AP and lateral views medications injected is 20 mg dexamethasone +10mL 

preservative-free normal saline and 2 mL contrast- condition at discharge is 

stable patient tolerated procedure well had no complications.











EZ MACHUCA MD               Mar 25, 2022 09:51

## 2022-04-11 ENCOUNTER — HOSPITAL ENCOUNTER (OUTPATIENT)
Dept: HOSPITAL 61 - PNCL | Age: 57
Discharge: HOME | End: 2022-04-11
Attending: ANESTHESIOLOGY
Payer: COMMERCIAL

## 2022-04-11 DIAGNOSIS — M51.36: ICD-10-CM

## 2022-04-11 DIAGNOSIS — Z79.82: ICD-10-CM

## 2022-04-11 DIAGNOSIS — Z98.890: ICD-10-CM

## 2022-04-11 DIAGNOSIS — M48.061: Primary | ICD-10-CM

## 2022-04-11 DIAGNOSIS — F17.210: ICD-10-CM

## 2022-04-11 DIAGNOSIS — M47.817: ICD-10-CM

## 2022-04-11 DIAGNOSIS — I10: ICD-10-CM

## 2022-04-11 DIAGNOSIS — Z79.899: ICD-10-CM

## 2022-04-11 DIAGNOSIS — M47.816: ICD-10-CM

## 2022-04-11 PROCEDURE — 99212 OFFICE O/P EST SF 10 MIN: CPT

## 2022-04-11 PROCEDURE — G0463 HOSPITAL OUTPT CLINIC VISIT: HCPCS

## 2022-04-11 NOTE — PDOC
Progress Note - Pain Clinic


Date of Service:


DOS:


DATE: 4/11/22 


TIME: 10:48





Diagnosis:


Dx:


Lumbar degenerative disease lumbar spinal stenosis


Lumbar and lumbosacral spondylosis





History or Present Illness:


HPI:


56-year-old male returns for follow-up status post lumbar epidural steroid 

injection June 25, 2022.  Patient reports significant decrease in pain in the 

lower extremities with a chief complaint today is low back pain patient has 

recently seen his neurosurgeon who is not recommending surgical alternatives at 

this time and is recommending more conservative treatments.  Patient reports his

pain is now only in the back and not rating to the lower extremities was much 

more intense than it was patient reports is a 6-7 on scale 10 is worst 5-6 on 

average 2-3 at its least and is a 5 today in the low back itself essentially 

right equal to left as sharp and shooting across the back stabbing burning scott

lity worse with standing walking worse with getting up from seated position or 

sitting for prolonged periods greater than 30 to 40 minutes patient reports no 

radiation to the lower extremities currently no bowel or bladder incontinence 

reports it wakes him sleep at night about once a night but better usually with 

laying down.  Patient is continue with stretching strength exercises as well as 

heat and ice applications and oral analgesics Tylenol and ibuprofen with only 

minimal decrease in pain with all these modalities.  Patient reports no bowel or

bladder incontinence no loss of motor function no fatigability significantly in 

the lower extremity since his last visit.





Physical Exam:


VS:


Blood pressure is 162/106 pulse 70 respirations 18 temperature 98.1 F height is

5 foot 11 inches weight 238 pounds.


PE:


PHYSICAL EXAMINATION:





GENERAL: The patient is awake, alert, oriented, appropriate, very pleasant in 

demeanor


HEENT: Shows normocephalic, atraumatic.  Extraocular movements are intact and 

symmetrical.  Oral cavity: Mucous membranes moist and pink.  Dentition is 

intact.


NECK: Shows anterior throat supple without palpable lymphadenopathy noted.  

Swallow reflex symmetrical.


CHEST: Shows normal on inspection.  Breath sounds are clear bilaterally, no 

rales or rhonchi auscultated.


HEART: Shows S1, S2 clear.  No murmurs auscultated.


ABDOMEN: Soft, nontender, nondistended.  No palpable organomegaly is noted.  


BACK: Shows spine grossly in the midline.  Normal-appearing cervical lordotic 

curvature.  There is slightly increased thoracic kyphosis, some minor flattening

of the lumbar lordotic curvature.  Lumbar paraspinous muscles show symmetrical 

on inspection, on palpation shows some moderate tenderness diffusely throughout 

the upper, middle and lower distribution of the paraspinous muscles, but without

specific trigger points, without radiation of pain.  The patient has good 

rotational motion of the lumbar spine, both laterally as well as extension and 

flexion with significant tenderness with extension axial loading of the lumbar 

spine as well as with right and left lateral rotation with significant 

tenderness but without radiation forward flexion is performed at 45 degrees 

without significant pain reported.


EXTREMITIES: Lower extremities show deep tendon reflexes 2 in the patellar and 

tendo calcaneus tendons.  Motor exam is 5 on a scale of 5 with right 

dorsiflexion, extension, quadriceps and hamstring flexion and 5/5 on the left.  

Peripheral pulses are 1+ posterior tibial.  No peripheral edema is noted 

bilaterally.  Lower extremities are warm and dry.


SKIN: Shows warm and dry, good turgor.  No edema.  No sores, rashes or bruising 

throughout.





Procedure:


Procedure:


Options discussed with the patient.  Patient's old chart was reviewed his 

current medication regimen updated current review of systems updated today as 

well.  We will preauthorize patient for bilateral lumbar facet medial branch 

blocks at the L4-5 and L5-S1 levels.  Patient with axial pain without radiation 

to the lower extremities worse with sitting walking standing changing positions 

and extension of the lumbar spine especially with axial loading.  Once approved,

patient will return to clinic for bilateral L4-5 and L5-S1 facet medial branch 

blocks with fluoroscopic guidance.  Meantime, patient will continue with 

stretching strength exercise as well as heat and ice applications and oral 

analgesics as currently.





Medication Injected:


Med Injected:


None





Condition at Discharge:


Condition at Discharge:


Condition at discharge is stable.











EZ MACHUCA MD               Apr 11, 2022 10:53

## 2022-04-26 ENCOUNTER — HOSPITAL ENCOUNTER (OUTPATIENT)
Dept: HOSPITAL 61 - PNCL | Age: 57
Discharge: HOME | End: 2022-04-26
Attending: ANESTHESIOLOGY
Payer: COMMERCIAL

## 2022-04-26 DIAGNOSIS — Z79.82: ICD-10-CM

## 2022-04-26 DIAGNOSIS — I10: ICD-10-CM

## 2022-04-26 DIAGNOSIS — Z72.89: ICD-10-CM

## 2022-04-26 DIAGNOSIS — M51.36: Primary | ICD-10-CM

## 2022-04-26 DIAGNOSIS — F17.210: ICD-10-CM

## 2022-04-26 DIAGNOSIS — M47.817: ICD-10-CM

## 2022-04-26 DIAGNOSIS — Z98.890: ICD-10-CM

## 2022-04-26 DIAGNOSIS — M48.061: ICD-10-CM

## 2022-04-26 DIAGNOSIS — M47.816: ICD-10-CM

## 2022-04-26 DIAGNOSIS — Z79.899: ICD-10-CM

## 2022-04-26 PROCEDURE — 64494 INJ PARAVERT F JNT L/S 2 LEV: CPT

## 2022-04-26 PROCEDURE — 64493 INJ PARAVERT F JNT L/S 1 LEV: CPT

## 2022-04-26 NOTE — PDOC
Progress Note - Pain Clinic


Date of Service:


DOS:


DATE: 4/26/22 


TIME: 08:41





Diagnosis:


Dx:


Lumbar degenerative disc disease with lumbar spinal stenosis


Lumbar and lumbosacral spondylosis





History or Present Illness:


HPI:


56-year-old male returns for follow-up status post lumbar epidural steroid 

injection x3 with only minimal improvement after the last injection, where 

previously he had done very well with these the pain unchanged however and is 

now in the low back itself without radiation to the lower extremities patient r

eports stabbing sharp and shooting in the back itself without radiation to the 

legs patient reports is worse with standing walking specially reaching over his 

head with his arms and axial loading of the lumbar spine with extension of the 

low back patient reports right side is worse than the left fairly significant on

both sides patient rates as a 7 on scale 10 is worse over the past week 6 on 

average 3 to [place] is a 6 today patient reports its aching worse with walking

standing changing positions better with sitting or laying down generally wakes 

him sleep about once to twice a night from the back pain.  Patient reports no 

bowel or bladder incontinence.





Physical Exam:


VS:


Blood pressure is 155/99 pulse 78 respirations 20 temperature is 98.1 F weight 

is 240 pounds.


PE:


PHYSICAL EXAMINATION:





GENERAL: The patient is awake, alert, oriented, appropriate, very pleasant in 

demeanor


HEENT: Shows normocephalic, atraumatic.  Extraocular movements are intact and 

symmetrical.  Oral cavity: Mucous membranes moist and pink.  Dentition is 

intact.


NECK: Shows anterior throat supple without palpable lymphadenopathy noted.  

Swallow reflex symmetrical.


CHEST: Shows normal on inspection.  Breath sounds are clear bilaterally.


HEART: Shows S1, S2 clear.  No murmurs auscultated.


ABDOMEN: Soft, nontender, nondistended.  No palpable organomegaly is noted.  


BACK: Shows spine grossly in the midline.  Normal-appearing cervical lordotic 

curvature.  There is slightly increased thoracic kyphosis, some minor flattening

of the lumbar lordotic curvature.  Lumbar paraspinous muscles show symmetrical 

on inspection, on palpation shows some moderate tenderness diffusely throughout 

the upper, middle and lower distribution of the paraspinous muscles, but without

specific trigger points, without radiation of pain.  The patient has good 

rotational motion of the lumbar spine, both laterally as well as extension and 

flexion with significant difficulty with pain with right lateral rotation greate

r than left greater than 10 degrees also with extension of the lumbar spine 

axial loading significant pain in the low back again more on the right than left

and present bilaterally this is better with forward flexion performed at 45 

degrees without significant increase in pain.


EXTREMITIES: Lower extremities show deep tendon reflexes 2+ in the patellar and 

tendo calcaneus tendons.  Motor exam is 5 on a scale of 5 with right 

dorsiflexion, extension, quadriceps and hamstring flexion and 5/5 on the left.  

Peripheral pulses are 1+ posterior tibial.  No peripheral edema is noted 

bilaterally.  Lower extremities are warm and dry.


SKIN: Shows warm and dry, good turgor.  No edema.  No sores, rashes or bruising 

throughout.





Procedure:


Procedure:


Options were discussed with patient.  Patient's old chart was reviewed his 

current medication regimen updated current review of systems updated today as 

well.  We will proceed with bilateral L4-5 and L5-S1 facet medial branch blocks 

today with fluoroscopic guidance.  Risks were discussed including but not 

limited to: Bleeding, infection, possibility of epidural hematoma and subsequent

neurological compromise, dural puncture, headaches, spinal cord and/or nerve 

damage, side effects of steroid medication, and poor results regarding pain 

control.  Patient understands and wished to proceed.  Patient will return to the

clinic in approximately 2 weeks for follow-up, was counseled as to return 

appointment, activity level, and side effects to be aware of.





Medication Injected:


Med Injected:


Under sterile prep and drape using C-arm fluoroscopic guidance AP and lateral 

and oblique views, bilateral L4-5 and L5-S1 facet joint MB's injections were 

performed, using 22-gauge quinke needles with stylette's x4,, medications 

injected: 20 mg dexamethasone +4 cc 0.25% bupivacaine +2 cc contrast.  Condition

at discharge is stable, patient tolerated  the procedure well and no 

complications.





Condition at Discharge:


Condition at Discharge:


Condition at discharge is stable, patient tolerated procedure well and had no 

complications.











EZ MACHUCA MD               Apr 26, 2022 08:44

## 2022-04-26 NOTE — PDOC4
Procedure Note:


ICD 10 Code:


ICD 10 Code:


M4 7.816


M4 7.817





Procedure Note:


Patient was consented for bilateral facet medial branch blocks with fluoroscopic

guidance.  Risks were discussed including but not limited to: Bleeding, 

infection, possibility of epidural hematoma and subsequent neurological 

compromise, dural puncture, headaches, spinal cord and/or nerve damage, side 

effects of steroid medication, and poor results regarding pain control.  Patient

understands and wished to proceed.


Under sterile prep and drape using C-arm fluoroscopic guidance AP and lateral an

d oblique views, bilateral L4-5 and L5-S1 facet joint MB's injections were 

performed, using 22-gauge quinke needles with stylette's x4,, medications 

injected: 20 mg dexamethasone +4 cc 0.25% bupivacaine +2 cc contrast.  Condition

at discharge is stable, patient tolerated  the procedure well and no 

complications.











EZ MACHUCA MD               Apr 26, 2022 08:45

## 2022-05-10 ENCOUNTER — HOSPITAL ENCOUNTER (OUTPATIENT)
Dept: HOSPITAL 61 - PNCL | Age: 57
Discharge: HOME | End: 2022-05-10
Attending: ANESTHESIOLOGY
Payer: COMMERCIAL

## 2022-05-10 DIAGNOSIS — Z79.82: ICD-10-CM

## 2022-05-10 DIAGNOSIS — Z79.899: ICD-10-CM

## 2022-05-10 DIAGNOSIS — M47.817: ICD-10-CM

## 2022-05-10 DIAGNOSIS — M51.36: Primary | ICD-10-CM

## 2022-05-10 DIAGNOSIS — M47.816: ICD-10-CM

## 2022-05-10 DIAGNOSIS — F17.210: ICD-10-CM

## 2022-05-10 DIAGNOSIS — M48.061: ICD-10-CM

## 2022-05-10 DIAGNOSIS — I10: ICD-10-CM

## 2022-05-10 DIAGNOSIS — Z98.890: ICD-10-CM

## 2022-05-10 PROCEDURE — 99212 OFFICE O/P EST SF 10 MIN: CPT

## 2022-05-10 PROCEDURE — G0463 HOSPITAL OUTPT CLINIC VISIT: HCPCS

## 2022-05-10 NOTE — PDOC
Progress Note - Pain Clinic


Date of Service:


DOS:


DATE: 5/10/22 


TIME: 09:01





Diagnosis:


Dx:


 lumbar degenerative disc disease lumbar spinal stenosis and lumbar and 

lumbosacral spondylosis





History or Present Illness:


HPI:


56-year-old male returns for follow-up status post bilateral L4-5 L5-S1 medial 

branch blocks with an 80% improvement in low back pain bilaterally.  Patient 

reports is still doing better he is doing some gardening yesterday with much 

greater ease and comfort and walking greater distances doing some increased 

activities limited but increased from where he was with much greater ease and 

comfort patient reports pain is a 3 on scale 10 is worse over the past week to 

an average 2 to [place] is a 2 today patient reports that the pain is aching 

and stabbing can be shooting on and off in the low back but not rating to the 

lower extremities patient reports is worse on the right than the left but 

present bilaterally worse with extended standing or reaching up over his head 

with his arms such as reaching for a high shelf also with extension flexion and 

rotation greater to the right than the left with increased pain.  Patient 

reports no loss of motor function no bowel or bladder incontinence.  Patient 

reports he has been sleeping better at night as well and generally is not 

awakening from sleep when he is off of his feet.  Patient continues doing 

stretching strength exercises daily.





Physical Exam:


VS:


Blood pressure is 106/69 pulse 99 respirations 18 temperature 98.5 F weight is 

227 pounds.


PE:


PHYSICAL EXAMINATION:





GENERAL: The patient is awake, alert, oriented, appropriate, very pleasant in 

demeanor


HEENT: Shows normocephalic, atraumatic.  Extraocular movements are intact and 

symmetrical.  Oral cavity: Mucous membranes moist and pink.  Dentition is 

intact.


NECK: Shows anterior throat supple without palpable lymphadenopathy noted.  

Swallow reflex symmetrical.


CHEST: Shows normal on inspection.  Breath sounds are clear bilaterally.


HEART: Shows S1, S2 clear.  No murmurs auscultated.


ABDOMEN: Soft, nontender, nondistended.  No palpable organomegaly is noted.  


BACK: Shows spine grossly in the midline.  Normal-appearing cervical lordotic 

curvature.  There is slightly increased thoracic kyphosis, some mild flattening 

of the lumbar lordotic curvature.  Lumbar paraspinous muscles show symmetrical 

on inspection, on palpation shows some moderate tenderness diffusely throughout 

the upper, middle and lower distribution of the paraspinous musculature, but 

without specific trigger points, without radiation of pain.  The patient has 

good rotational motion of the lumbar spine, both laterally as well as extension 

and flexion with significant tenderness with extension and axial loading of the 

lumbar spine also increased pain with right rotation of motion past 10 degrees 

moderate to the left with forward flexion 45 degrees performed without 

significant difficulty.  No tenderness over the spinous processes, sacrum or 

sacroiliac regions.


EXTREMITIES: Lower extremities show deep tendon reflexes 2+ in the patellar and 

tendo calcaneus tendons.  Motor exam is 5 on a scale of 5 with right 

dorsiflexion, extension, quadriceps and hamstring flexion and 5/5 on the left.  

Peripheral pulses are 1 posterior tibial.  No peripheral edema is noted bilate

rally.  Lower extremities are warm and dry to touch, equal in color and 

appearance. 


SKIN: Shows warm and dry, good turgor.  No edema.  No sores, rashes or bruising 

throughout.





Procedure:


Procedure:


Options discussed with patient.  Patient's old chart was uses current medication

regimen updated current review of systems updated today as well. Patient doing 

significantly better after medial branch facet blocks of the bilateral L4-5 and 

L5-S1 levels.  We will preauthorize patient for repeat medial branch facet 

blocks L4-5 and L5-S1 bilaterally, diagnostic, in preparation for potential 

radiofrequency ablation if patient response is similar to first medial branch 

blocks..





Medication Injected:


Med Injected:


None





Condition at Discharge:


Condition at Discharge:


Condition at discharge is stable.











EZ MACHUCA MD               May 10, 2022 09:05